# Patient Record
Sex: MALE | Race: WHITE
[De-identification: names, ages, dates, MRNs, and addresses within clinical notes are randomized per-mention and may not be internally consistent; named-entity substitution may affect disease eponyms.]

---

## 2021-04-19 ENCOUNTER — HOSPITAL ENCOUNTER (EMERGENCY)
Dept: HOSPITAL 41 - JD.ED | Age: 66
Discharge: NURSING, REHAB OR LONG-TERM CARE | End: 2021-04-19
Payer: COMMERCIAL

## 2021-04-19 DIAGNOSIS — S22.43XA: ICD-10-CM
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
## 2021-04-19 DIAGNOSIS — J96.90: ICD-10-CM

## 2021-04-19 DIAGNOSIS — S12.190A: ICD-10-CM

## 2021-04-19 DIAGNOSIS — S22.019A: ICD-10-CM

## 2021-04-19 DIAGNOSIS — R57.8: ICD-10-CM
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
## 2021-04-19 DIAGNOSIS — S32.039A: Primary | ICD-10-CM

## 2021-04-19 DIAGNOSIS — S01.01XA: ICD-10-CM

## 2021-04-19 PROCEDURE — 85730 THROMBOPLASTIN TIME PARTIAL: CPT

## 2021-04-19 PROCEDURE — 85025 COMPLETE CBC W/AUTO DIFF WBC: CPT

## 2021-04-19 PROCEDURE — 70486 CT MAXILLOFACIAL W/O DYE: CPT

## 2021-04-19 PROCEDURE — 92950 HEART/LUNG RESUSCITATION CPR: CPT

## 2021-04-19 PROCEDURE — 72131 CT LUMBAR SPINE W/O DYE: CPT

## 2021-04-19 PROCEDURE — 86901 BLOOD TYPING SEROLOGIC RH(D): CPT

## 2021-04-19 PROCEDURE — 99291 CRITICAL CARE FIRST HOUR: CPT

## 2021-04-19 PROCEDURE — 51702 INSERT TEMP BLADDER CATH: CPT

## 2021-04-19 PROCEDURE — 83880 ASSAY OF NATRIURETIC PEPTIDE: CPT

## 2021-04-19 PROCEDURE — P9016 RBC LEUKOCYTES REDUCED: HCPCS

## 2021-04-19 PROCEDURE — 82553 CREATINE MB FRACTION: CPT

## 2021-04-19 PROCEDURE — 85610 PROTHROMBIN TIME: CPT

## 2021-04-19 PROCEDURE — 84484 ASSAY OF TROPONIN QUANT: CPT

## 2021-04-19 PROCEDURE — 72128 CT CHEST SPINE W/O DYE: CPT

## 2021-04-19 PROCEDURE — 86850 RBC ANTIBODY SCREEN: CPT

## 2021-04-19 PROCEDURE — 86922 COMPATIBILITY TEST ANTIGLOB: CPT

## 2021-04-19 PROCEDURE — 86900 BLOOD TYPING SEROLOGIC ABO: CPT

## 2021-04-19 PROCEDURE — 70450 CT HEAD/BRAIN W/O DYE: CPT

## 2021-04-19 PROCEDURE — 36415 COLL VENOUS BLD VENIPUNCTURE: CPT

## 2021-04-19 PROCEDURE — 80307 DRUG TEST PRSMV CHEM ANLYZR: CPT

## 2021-04-19 PROCEDURE — 83735 ASSAY OF MAGNESIUM: CPT

## 2021-04-19 PROCEDURE — 80053 COMPREHEN METABOLIC PANEL: CPT

## 2021-04-19 PROCEDURE — 86140 C-REACTIVE PROTEIN: CPT

## 2021-04-19 PROCEDURE — 81001 URINALYSIS AUTO W/SCOPE: CPT

## 2021-04-19 PROCEDURE — 72125 CT NECK SPINE W/O DYE: CPT

## 2021-04-19 PROCEDURE — 31500 INSERT EMERGENCY AIRWAY: CPT

## 2021-04-19 PROCEDURE — 74177 CT ABD & PELVIS W/CONTRAST: CPT

## 2021-04-19 PROCEDURE — 36430 TRANSFUSION BLD/BLD COMPNT: CPT

## 2021-04-19 PROCEDURE — 71260 CT THORAX DX C+: CPT

## 2021-04-19 PROCEDURE — 72170 X-RAY EXAM OF PELVIS: CPT

## 2021-04-19 PROCEDURE — 71045 X-RAY EXAM CHEST 1 VIEW: CPT
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
## 2021-04-19 NOTE — CR
Chest: Portable view of the chest was obtained.

 

Comparison: Prior chest CT study performed on the same day.

 

Previous rib fractures are poorly seen on this exam.  There is pleural

 thickening within both lung apices.  Lungs otherwise are grossly 

clear.  Heart size and mediastinum are normal.

 

Impression:

1.  Previous rib fractures are poorly identified on this study.

2.  Slight pleural thickening within both lung apices.

3.  Nothing acute is otherwise seen.

 

Diagnostic code #2
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
## 2021-04-19 NOTE — CR
Pelvis: AP view of the pelvis was obtained.

 

Previous: No prior pelvis exam is available other than that seen on CT

 abdomen and pelvis study.

 

Joint spaces within both hips are maintained.  No acute fracture or 

dislocation is seen.

 

Impression:

1.  Nothing acute is seen on AP pelvis study.

 

Diagnostic code #1
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
